# Patient Record
Sex: FEMALE | Race: WHITE | NOT HISPANIC OR LATINO | Employment: PART TIME | URBAN - METROPOLITAN AREA
[De-identification: names, ages, dates, MRNs, and addresses within clinical notes are randomized per-mention and may not be internally consistent; named-entity substitution may affect disease eponyms.]

---

## 2017-12-08 VITALS
TEMPERATURE: 98.1 F | HEIGHT: 71 IN | RESPIRATION RATE: 20 BRPM | BODY MASS INDEX: 20.3 KG/M2 | HEART RATE: 81 BPM | WEIGHT: 145 LBS | OXYGEN SATURATION: 97 % | SYSTOLIC BLOOD PRESSURE: 124 MMHG | DIASTOLIC BLOOD PRESSURE: 60 MMHG

## 2017-12-08 PROCEDURE — 81001 URINALYSIS AUTO W/SCOPE: CPT | Performed by: EMERGENCY MEDICINE

## 2017-12-08 PROCEDURE — 99283 EMERGENCY DEPT VISIT LOW MDM: CPT

## 2017-12-09 ENCOUNTER — HOSPITAL ENCOUNTER (EMERGENCY)
Facility: CLINIC | Age: 62
Discharge: HOME | End: 2017-12-09
Attending: EMERGENCY MEDICINE | Admitting: EMERGENCY MEDICINE
Payer: COMMERCIAL

## 2017-12-09 DIAGNOSIS — N30.01 ACUTE CYSTITIS WITH HEMATURIA: ICD-10-CM

## 2017-12-09 LAB
ALBUMIN UR-MCNC: NEGATIVE MG/DL
APPEARANCE UR: CLEAR
BACTERIA #/AREA URNS HPF: ABNORMAL /HPF
BILIRUB UR QL STRIP: NEGATIVE
COLOR UR AUTO: ABNORMAL
GLUCOSE UR STRIP-MCNC: NEGATIVE MG/DL
HGB UR QL STRIP: ABNORMAL
KETONES UR STRIP-MCNC: NEGATIVE MG/DL
LEUKOCYTE ESTERASE UR QL STRIP: ABNORMAL
NITRATE UR QL: NEGATIVE
PH UR STRIP: 5.5 PH (ref 5–7)
RBC #/AREA URNS AUTO: 0 /HPF (ref 0–2)
SOURCE: ABNORMAL
SP GR UR STRIP: 1 (ref 1–1.03)
UROBILINOGEN UR STRIP-MCNC: NORMAL MG/DL (ref 0–2)
WBC #/AREA URNS AUTO: 9 /HPF (ref 0–2)

## 2017-12-09 PROCEDURE — 25000132 ZZH RX MED GY IP 250 OP 250 PS 637: Performed by: EMERGENCY MEDICINE

## 2017-12-09 PROCEDURE — 87088 URINE BACTERIA CULTURE: CPT | Performed by: EMERGENCY MEDICINE

## 2017-12-09 PROCEDURE — 87186 SC STD MICRODIL/AGAR DIL: CPT | Performed by: EMERGENCY MEDICINE

## 2017-12-09 PROCEDURE — 87086 URINE CULTURE/COLONY COUNT: CPT | Performed by: EMERGENCY MEDICINE

## 2017-12-09 RX ORDER — NITROFURANTOIN 25; 75 MG/1; MG/1
100 CAPSULE ORAL 2 TIMES DAILY
Qty: 10 CAPSULE | Refills: 0 | Status: SHIPPED | OUTPATIENT
Start: 2017-12-09 | End: 2017-12-14

## 2017-12-09 RX ORDER — NITROFURANTOIN 25; 75 MG/1; MG/1
100 CAPSULE ORAL ONCE
Status: COMPLETED | OUTPATIENT
Start: 2017-12-09 | End: 2017-12-09

## 2017-12-09 RX ORDER — PHENAZOPYRIDINE HYDROCHLORIDE 200 MG/1
200 TABLET, FILM COATED ORAL 3 TIMES DAILY
Qty: 9 TABLET | Refills: 0 | Status: SHIPPED | OUTPATIENT
Start: 2017-12-09 | End: 2017-12-12

## 2017-12-09 RX ORDER — PHENAZOPYRIDINE HYDROCHLORIDE 100 MG/1
100 TABLET, FILM COATED ORAL ONCE
Status: COMPLETED | OUTPATIENT
Start: 2017-12-09 | End: 2017-12-09

## 2017-12-09 RX ADMIN — NITROFURANTOIN MONOHYDRATE/MACROCRYSTALLINE 100 MG: 25; 75 CAPSULE ORAL at 02:30

## 2017-12-09 RX ADMIN — PHENAZOPYRIDINE HYDROCHLORIDE 100 MG: 100 TABLET ORAL at 02:30

## 2017-12-09 ASSESSMENT — ENCOUNTER SYMPTOMS
FREQUENCY: 1
DYSURIA: 1
VOMITING: 0
HEMATURIA: 1
DIARRHEA: 0
FEVER: 0

## 2017-12-09 NOTE — ED NOTES
Bed: ED15  Expected date: 12/9/17  Expected time:   Means of arrival:   Comments:  Triage dysuria

## 2017-12-09 NOTE — ED PROVIDER NOTES
"  History     Chief Complaint:  Dysuria     The history is provided by the patient.      Nelly Rowe is a 62 year old female who presents to the emergency department today for evaluation of dysuria. The patient reports onset of dysuria and hematuria today. She reports there were droplets of blood in her urine earlier today. She reports some mild, constant, non-radiating suprapubic discomfort, and states there is severe dysuria with urination. She also endorses more frequent urination today. She denies fever, vomiting, diarrhea, or vaginal discharge. The patient states she is not currently sexually active. She does note a recent antibiotic treatment (Augmentin) for upper respiratory infection of which she did not complete because she felt better.     Allergies:  No Known Drug Allergies     Medications:   DiphenhydrAMINE HCl (ZZZQUIL) 50 MG/30ML LIQD   Acetaminophen (TYLENOL 8 HOUR PO)   zaleplon (SONATA) 10 MG capsule     Past Medical History:    Incomplete right bundle branch block   PONV (postoperative nausea and vomiting)   Raynaud disease   Thyroid disease    Past Surgical History:    Breast reduction  Hernia repair   Septoplasty  Thyroidectomy     Family History:    CAD  Cerebrovascular disease    Social History:  The patient was accompanied to the ED by herself.  Smoking Status: Never smoker  Smokeless Tobacco: Never used   Alcohol Use: Yes    Marital Status:        Review of Systems   Constitutional: Negative for fever.   Gastrointestinal: Negative for diarrhea and vomiting.   Genitourinary: Positive for dysuria, frequency and hematuria. Negative for vaginal discharge.   All other systems reviewed and are negative.    Physical Exam     Patient Vitals for the past 24 hrs:   BP Temp Temp src Pulse Resp SpO2 Height Weight   12/08/17 2252 124/60 98.1  F (36.7  C) Temporal 81 20 97 % 1.803 m (5' 11\") 65.8 kg (145 lb)      Physical Exam  General: Well-developed and well-nourished; well appearing "  female; cooperative  Head:  Atraumatic  Eyes:  Extraocular movements intact; conjunctivae, lids, and sclerae are normal  ENT:    Normal nose; moist mucous membranes  Neck:  Supple; normal range of motion  CV:  Regular rate and rhythm; normal heart sounds with no murmurs, rubs, or gallops detected  Resp:  No respiratory distress; clear to auscultation bilaterally without decreased breath sounds, wheezing, rales, or rhonchi  GI:  Soft; non-distended; non-tender    MS:  Normal ROM;   Skin:  Warm; non-diaphoretic; no pallor  Neuro:  Awake; A&Ox3; normal strength  Psych: Normal mood and affect; normal speech  Vitals reviewed.  Emergency Department Course     Laboratory:  Laboratory findings were communicated with the patient who voiced understanding of the findings.    UA: Light yellow and clear urine. Specific gravity urine 1.001 (L), Urine blood moderate, Leukocyte esterase urine large, WBC/HPF 9 (H), Bacteria few, o/w WNL      Urine Culture Aerobic Bacterial: Pending     Interventions:  0230 Macrobid 100mg PO  0230 Pyridium 100mg PO      Emergency Department Course:  Nursing notes and vitals reviewed.  0105 I performed an exam of the patient as documented above.   The patient provided a urine sample here in the emergency department. This was sent for laboratory testing, findings above.   0210 Patient updated on her urinalysis results.   I discussed the treatment plan with the patient. She expressed understanding of this plan and consented to discharge. She will be discharged home with instructions for care and follow up. In addition, the patient will return to the emergency department if her symptoms persist, worsen, if new symptoms arise or if there is any concern.  All questions were answered. I personally reviewed the laboratory results with the patient and answered all related questions prior to discharge.   Impression & Plan      Medical Decision Making:  Nelly is a 62-year-old female who presents  with one day of dysuria and hematuria.  Patient also endorses suprapubic discomfort but denies fever, flank pain, vomiting, or any other concerns.  She declined offer for external genitourinary exam.  UA revealed only nine white blood cells per high-powered field and there are no RBCs noted.  There are few bacteria.  Given patient's symptoms that are consistent with urinary tract infection, I believe it reasonable to treat empirically pending urine culture in the meantime.  I discussed this with patient and educated that there are other causes for her hematuria and that if her symptoms do not improve after antibiotic treatment she will require further workup and evaluation of symptoms, primarily hematuria.  Patient is amenable to this plan and was given first dose of both Macrobid and Pyridium here.  She will continue Tylenol or ibuprofen as needed for pain. I recommended she follow up with her primary care provider and provided strict return precautions.  She verbalized understanding and is amenable to discharge.    Diagnosis:    ICD-10-CM    1. Acute cystitis with hematuria N30.01        Disposition:  Discharged home with the below prescriptions.     Discharge Medications:   Details   phenazopyridine (PYRIDIUM) 200 MG tablet Take 1 tablet (200 mg) by mouth 3 times daily for 3 days, Disp-9 tablet, R-0, Local Print      nitroFURantoin, macrocrystal-monohydrate, (MACROBID) 100 MG capsule Take 1 capsule (100 mg) by mouth 2 times daily for 5 days, Disp-10 capsule, R-0, Local Print     Scribe Disclosure:  Allan HONEYCUTT, am serving as a scribe at 12:47 AM on 12/9/2017 to document services personally performed by Rachel Ortiz MD based on my observations and the provider's statements to me.    12/8/2017    EMERGENCY DEPARTMENT       Rachel Ortiz MD  12/09/17 0884

## 2017-12-09 NOTE — ED AVS SNAPSHOT
Emergency Department    [address] [facility] [address]    Phone:  [phone]    Fax:  [phone]                                       Nelly Rowe   MRN: [id number]    Department:   Emergency Department   Date of Visit:  12/8/2017           Patient Information     Date Of Birth          1955        Your diagnoses for this visit were:     Acute cystitis with hematuria        You were seen by Rachel Ortiz MD.      Follow-up Information     Follow up with Marie Soriano PA-C In 3 days.    Specialty:  Physician Assistant    Contact information:    [address] [id number] [phone]          Follow up with  Emergency Department.    Specialty:  EMERGENCY MEDICINE    Why:  If symptoms worsen    Contact information:    [address] [facility] [address] [phone]        Discharge Instructions       We are treating you for a UTI given your symptoms.   Take all antibiotics, even if you are feeling better.  Take Pyridium for pain. It may make your urine orange, so don't be alarmed.  Use Tylenol or Motrin as needed for other pain.  If you do not have improvement in symptoms after antibioics, particularly with continued bloody urine, you will need further investigation into the cause.  A urine culture was sent to help differentiate if there are bacteria in the urine or not.  Either way you should see your primary care provider in the coming week for re-evaluation.  Return to the ER if you have new or concerning symptoms including, but not limited to, copious bleeding, fever >100.4F, severe abdomen or back pain, vomiting, diarrhea, or any other concerns.      Discharge References/Attachments     URINARY TRACT INFECTIONS (UTIS), UNDERSTANDING (ENGLISH)    HEMATURIA: POSSIBLE CAUSES (ENGLISH)      24 Hour Appointment Hotline       To make an appointment at any [facility], call [phone] ([phone]). If you don't have a  family doctor or clinic, we will help you find one. [facility] clinics are conveniently located to serve the needs of you and your family.             Review of your medicines      START taking        Dose / Directions Last dose taken    nitroFURantoin (macrocrystal-monohydrate) 100 MG capsule   Commonly known as:  MACROBID   Dose:  100 mg   Quantity:  10 capsule        Take 1 capsule (100 mg) by mouth 2 times daily for 5 days   Refills:  0        phenazopyridine 200 MG tablet   Commonly known as:  PYRIDIUM   Dose:  200 mg   Quantity:  9 tablet        Take 1 tablet (200 mg) by mouth 3 times daily for 3 days   Refills:  0          Our records show that you are taking the medicines listed below. If these are incorrect, please call your family doctor or clinic.        Dose / Directions Last dose taken    SONATA 10 MG capsule   Dose:  10 mg   Generic drug:  zaleplon        Take 10 mg by mouth At Bedtime PRN   Refills:  0        TYLENOL 8 HOUR PO        Take by mouth as needed   Refills:  0        ZZZQUIL 50 MG/30ML Liqd   Dose:  1 dose.   Generic drug:  DiphenhydrAMINE HCl        Take 1 dose by mouth At Bedtime   Refills:  0                Prescriptions were sent or printed at these locations (2 Prescriptions)                   Other Prescriptions                Printed at Department/Unit printer (2 of 2)         phenazopyridine (PYRIDIUM) 200 MG tablet               nitroFURantoin, macrocrystal-monohydrate, (MACROBID) 100 MG capsule                Procedures and tests performed during your visit     UA with Microscopic    Urine Culture      Orders Needing Specimen Collection     None      Pending Results     Date and Time Order Name Status Description    12/9/2017 0210 Urine Culture In process             Pending Culture Results     Date and Time Order Name Status Description    12/9/2017 0210 Urine Culture In process             Pending Results Instructions     If you had any lab results that were not finalized at the  time of your Discharge, you can call the ED Lab Result RN at [phone]. You will be contacted by this team for any positive Lab results or changes in treatment. The nurses are available 7 days a week from 10A to 6:30P.  You can leave a message 24 hours per day and they will return your call.        Test Results From Your Hospital Stay        12/9/2017  1:56 AM      Component Results     Component Value Ref Range & Units Status    Color Urine Light Yellow  Final    Appearance Urine Clear  Final    Glucose Urine Negative NEG^Negative mg/dL Final    Bilirubin Urine Negative NEG^Negative Final    Ketones Urine Negative NEG^Negative mg/dL Final    Specific Gravity Urine 1.001 (L) 1.003 - 1.035 Final    Blood Urine Moderate (A) NEG^Negative Final    pH Urine 5.5 5.0 - 7.0 pH Final    Protein Albumin Urine Negative NEG^Negative mg/dL Final    Urobilinogen mg/dL Normal 0.0 - 2.0 mg/dL Final    Nitrite Urine Negative NEG^Negative Final    Leukocyte Esterase Urine Large (A) NEG^Negative Final    Source Midstream Urine  Final    WBC Urine 9 (H) 0 - 2 /HPF Final    RBC Urine 0 0 - 2 /HPF Final    Bacteria Urine Few (A) NEG^Negative /HPF Final         12/9/2017  2:25 AM                Clinical Quality Measure: Blood Pressure Screening     Your blood pressure was checked while you were in the emergency department today. The last reading we obtained was  BP: 124/60 . Please read the guidelines below about what these numbers mean and what you should do about them.  If your systolic blood pressure (the top number) is less than 120 and your diastolic blood pressure (the bottom number) is less than 80, then your blood pressure is normal. There is nothing more that you need to do about it.  If your systolic blood pressure (the top number) is 120-139 or your diastolic blood pressure (the bottom number) is 80-89, your blood pressure may be higher than it should be. You should have your blood pressure rechecked within a year by a  "primary care provider.  If your systolic blood pressure (the top number) is 140 or greater or your diastolic blood pressure (the bottom number) is 90 or greater, you may have high blood pressure. High blood pressure is treatable, but if left untreated over time it can put you at risk for heart attack, stroke, or kidney failure. You should have your blood pressure rechecked by a primary care provider within the next 4 weeks.  If your provider in the emergency department today gave you specific instructions to follow-up with your doctor or provider even sooner than that, you should follow that instruction and not wait for up to 4 weeks for your follow-up visit.        Thank you for choosing [facility]       Thank you for choosing [facility] for your care. Our goal is always to provide you with excellent care. Hearing back from our patients is one way we can continue to improve our services. Please take a few minutes to complete the written survey that you may receive in the mail after you visit with us. Thank you!        [employer] Information     [employer] lets you send messages to your doctor, view your test results, renew your prescriptions, schedule appointments and more. To sign up, go to [email or web address] . Click on \"Log in\" on the left side of the screen, which will take you to the Welcome page. Then click on \"Sign up Now\" on the right side of the page.     You will be asked to enter the access code listed below, as well as some personal information. Please follow the directions to create your username and password.     Your access code is: [email or web address]  Expires: 3/9/2018  2:32 AM     Your access code will  in 90 days. If you need help or a new code, please call your [facility] clinic or [phone].        Care EveryWhere ID     This is your Care EveryWhere ID. This could be used by other organizations to access your [facility] medical records  [id number]        Equal Access to Services     KRISTOPHER HUGGINS AH: " Hadii alfred Rodriguez, wayadyda stu, qamaribelta kaalmauro roberson. So [facility] [phone].    ATENCIÓN: Si habla español, tiene a hunter disposición servicios gratuitos de asistencia lingüística. Llame al [phone].    We comply with applicable federal civil rights laws and [place] laws. We do not discriminate on the basis of race, color, national origin, age, disability, sex, sexual orientation, or gender identity.            After Visit Summary       This is your record. Keep this with you and show to your community pharmacist(s) and doctor(s) at your next visit.

## 2017-12-09 NOTE — DISCHARGE INSTRUCTIONS
We are treating you for a UTI given your symptoms.   Take all antibiotics, even if you are feeling better.  Take Pyridium for pain. It may make your urine orange, so don't be alarmed.  Use Tylenol or Motrin as needed for other pain.  If you do not have improvement in symptoms after antibioics, particularly with continued bloody urine, you will need further investigation into the cause.  A urine culture was sent to help differentiate if there are bacteria in the urine or not.  Either way you should see your primary care provider in the coming week for re-evaluation.  Return to the ER if you have new or concerning symptoms including, but not limited to, copious bleeding, fever >100.4F, severe abdomen or back pain, vomiting, diarrhea, or any other concerns.

## 2017-12-09 NOTE — ED AVS SNAPSHOT
Emergency Department    [address] [facility] [address]    Phone:  [phone]    Fax:  [phone]                                       Nelly Rowe   MRN: [id number]    Department:   Emergency Department   Date of Visit:  12/8/2017           After Visit Summary Signature Page     I have received my discharge instructions, and my questions have been answered. I have discussed any challenges I see with this plan with the nurse or doctor.    ..........................................................................................................................................  Patient/Patient Representative Signature      ..........................................................................................................................................  Patient Representative Print Name and Relationship to Patient    ..................................................               ................................................  Date                                            Time    ..........................................................................................................................................  Reviewed by Signature/Title    ...................................................              ..............................................  Date                                                            Time

## 2017-12-10 LAB
BACTERIA SPEC CULT: ABNORMAL
Lab: ABNORMAL
SPECIMEN SOURCE: ABNORMAL

## 2017-12-11 ENCOUNTER — TELEPHONE (OUTPATIENT)
Dept: EMERGENCY MEDICINE | Facility: CLINIC | Age: 62
End: 2017-12-11

## 2017-12-11 NOTE — TELEPHONE ENCOUNTER
[facility] Emergency Department Lab result notification:    [place] ED lab result protocol used  Urine Culture Protcol    Reason for call  Notify of lab results, assess symptoms,  review ED providers recommendations/discharge instructions (if necessary) and advise per ED lab result f/u protocol    Lab Result  Final Urine Culture Report on 12/11/2017  [place] ED discharge antibiotic: Nitrofurantoin Macrocrystal-Monohydrate (Macrobid) 100 mg PO capsule,  Take 1 capsule (100 mg) by mouth 2 times daily for 5 days  #1. Bacteria, 10,000 to 50,000 colonies/mL Escherichia coli, is SUSCEPTIBLE to ED discharge antibiotic.    As per [place] ED Lab Result protocol, no change in antibiotic therapy.  Information table from ED Provider visit on 12/08/2017  Symptoms reported at ED visit (Chief complaint, HPI)  62 year old female who presents to the emergency department today for evaluation of dysuria. The patient reports onset of dysuria and hematuria today. She reports there were droplets of blood in her urine earlier today. She reports some mild, constant, non-radiating suprapubic discomfort, and states there is severe dysuria with urination. She also endorses more frequent urination today. She denies fever, vomiting, diarrhea, or vaginal discharge. The patient states she is not currently sexually active. She does note a recent antibiotic treatment (Augmentin) for upper respiratory infection of which she did not complete because she felt better.    ED providers Impression and Plan (applicable information) a 62-year-old female who presents with one day of dysuria and hematuria.  Patient also endorses suprapubic discomfort but denies fever, flank pain, vomiting, or any other concerns.  She declined offer for external genitourinary exam.  UA revealed only nine white blood cells per high-powered field and there are no RBCs noted.  There are few bacteria.  Given patient's symptoms that are consistent with urinary tract  infection, I believe it reasonable to treat empirically pending urine culture in the meantime.  I discussed this with patient and educated that there are other causes for her hematuria and that if her symptoms do not improve after antibiotic treatment she will require further workup and evaluation of symptoms, primarily hematuria.  Patient is amenable to this plan and was given first dose of both Macrobid and Pyridium here.  She will continue Tylenol or ibuprofen as needed for pain. I recommended she follow up with her primary care provider and provided strict return precautions.  She verbalized understanding and is amenable to discharge.   Miscellaneous information Follow up with Marie Soriano PA-C In 3 days.     RN Assessment (Patient s current Symptoms), include time called.  [Insert Left message here if message left]  At 1655 Left voicemail message requesting a call back to [phone] between 10 a.m. and 6:30 p.m., 7 days a week for patient's ED/UC lab results.  May leave a message 24/7, if no one available.     PCP follow-up Questions asked: NO    Megan Jacobs, RN  [place] [facility] RN  Lung Nodule and ED Lab Result F/u RN  Epic pool (ED late result f/u RN): [id number]  FV INCIDENTAL RADIOLOGY F/U NURSES: [id number]  Ph# [phone]      Copy of Lab result   Exam Information   Exam Date Exam Time Accession # Results    12/9/17  1:42 AM     Component Results   Component Collected Lab   Specimen Description 12/09/2017  1:42 AM 75   Midstream Urine   Special Requests 12/09/2017  1:42 AM 75   Specimen received in preservative   Culture Micro (Abnormal) 12/09/2017  1:42 AM 75   10,000 to 50,000 colonies/mL   Escherichia coli      Culture & Susceptibility   ESCHERICHIA COLI   Antibiotic Interpretation Sensitivity Unit Method Status   AMPICILLIN Sensitive 8 ug/mL SOBEIDA Final   AMPICILLIN/SULBACTAM Sensitive <=2 ug/mL SOBEIDA Final   CEFAZOLIN Sensitive <=4 ug/mL SOBEIDA Final   Comment: Cefazolin  SOBEIDA breakpoints are for the treatment of uncomplicated urinary tract   infections.  For the treatment of systemic infections, please contact the   laboratory for additional testing.   CEFEPIME Sensitive <=1 ug/mL SOBEIDA Final   CEFOXITIN Sensitive <=4 ug/mL SOBEIDA Final   CEFTAZIDIME Sensitive <=1 ug/mL SOBEIDA Final   CEFTRIAXONE Sensitive <=1 ug/mL SOBEIDA Final   CIPROFLOXACIN Sensitive 1 ug/mL SOBEIDA Final   GENTAMICIN Sensitive <=1 ug/mL SOBEIDA Final   LEVOFLOXACIN Sensitive 1 ug/mL SOBEIDA Final   NITROFURANTOIN Sensitive <=16 ug/mL SOBEIDA Final   Piperacillin/Tazo Sensitive <=4 ug/mL SOBEIDA Final   TOBRAMYCIN Sensitive <=1 ug/mL SOBEIDA Final   Trimethoprim/Sulfa Sensitive <=1/19 ug/mL SOBEIDA Final

## 2022-08-25 ENCOUNTER — HOSPITAL ENCOUNTER (OUTPATIENT)
Dept: MAMMOGRAPHY | Facility: CLINIC | Age: 67
Discharge: HOME | End: 2022-08-25
Attending: FAMILY MEDICINE
Payer: COMMERCIAL

## 2022-08-25 DIAGNOSIS — N64.4 MASTODYNIA OF RIGHT BREAST: ICD-10-CM

## 2022-08-25 PROCEDURE — G0279 TOMOSYNTHESIS, MAMMO: HCPCS

## 2025-07-30 ENCOUNTER — HOSPITAL ENCOUNTER (OUTPATIENT)
Dept: MAMMOGRAPHY | Facility: CLINIC | Age: 70
Discharge: HOME | End: 2025-07-30
Attending: FAMILY MEDICINE
Payer: COMMERCIAL

## 2025-07-30 DIAGNOSIS — Z12.31 VISIT FOR SCREENING MAMMOGRAM: ICD-10-CM

## 2025-07-30 PROCEDURE — 77063 BREAST TOMOSYNTHESIS BI: CPT
